# Patient Record
Sex: FEMALE | Race: WHITE | NOT HISPANIC OR LATINO | URBAN - NONMETROPOLITAN AREA
[De-identification: names, ages, dates, MRNs, and addresses within clinical notes are randomized per-mention and may not be internally consistent; named-entity substitution may affect disease eponyms.]

---

## 2018-04-05 NOTE — PATIENT DISCUSSION
COUNSELING: Sent message to patient to call and schedule an appointment with Dr. Oliveira to follow up on hypertension per Jayla in pharmacy.  Theresa Jackson CMA

## 2018-09-05 PROBLEM — H52.13: Noted: 2018-09-05

## 2021-07-16 ENCOUNTER — IMPORTED ENCOUNTER (OUTPATIENT)
Dept: URBAN - NONMETROPOLITAN AREA CLINIC 1 | Facility: CLINIC | Age: 46
End: 2021-07-16

## 2022-04-09 ASSESSMENT — VISUAL ACUITY
OS_CC: 20/400
OD_CC: 20/400

## 2025-05-01 NOTE — PATIENT DISCUSSION
[facility] Patient referred here after having blood drawn on Friday and receiving a call from her primary doctor today notifying her that her hemoglobin is 7.0 and she needs to come to the ER. She complains of persistent fatigue, bodyaches, dizziness, headache, and nausea, and reports that although she takes BID iron supplements her hemoglobin continues to drop due to extremely heavy periods 1-2x per month. She also takes warfarin due to history of blood clots which is contributory.